# Patient Record
Sex: MALE | Race: BLACK OR AFRICAN AMERICAN | Employment: FULL TIME | URBAN - METROPOLITAN AREA
[De-identification: names, ages, dates, MRNs, and addresses within clinical notes are randomized per-mention and may not be internally consistent; named-entity substitution may affect disease eponyms.]

---

## 2017-03-02 ENCOUNTER — OFFICE VISIT (OUTPATIENT)
Dept: FAMILY MEDICINE CLINIC | Facility: CLINIC | Age: 22
End: 2017-03-02

## 2017-03-02 VITALS
RESPIRATION RATE: 16 BRPM | TEMPERATURE: 98 F | WEIGHT: 228 LBS | BODY MASS INDEX: 34 KG/M2 | OXYGEN SATURATION: 99 % | HEART RATE: 64 BPM | SYSTOLIC BLOOD PRESSURE: 110 MMHG | DIASTOLIC BLOOD PRESSURE: 68 MMHG

## 2017-03-02 DIAGNOSIS — J45.20 MILD INTERMITTENT ASTHMA IN ADULT WITHOUT COMPLICATION: Primary | ICD-10-CM

## 2017-03-02 PROCEDURE — 99214 OFFICE O/P EST MOD 30 MIN: CPT | Performed by: EMERGENCY MEDICINE

## 2017-03-02 RX ORDER — ALBUTEROL SULFATE 90 UG/1
2 AEROSOL, METERED RESPIRATORY (INHALATION) EVERY 4 HOURS PRN
Qty: 1 INHALER | Refills: 2 | Status: SHIPPED | OUTPATIENT
Start: 2017-03-02 | End: 2017-08-17

## 2017-03-02 NOTE — PROGRESS NOTES
Chief Complaint:   Patient presents with:  Asthma: Pt here for Asthma f/u and fmla paper work. HPI:   This is a 23year old male     Here for follow up asthma  Needs completion of FMLA forms for asthma. Missing work because of asthma.  Has to leave wor Weight as of this encounter: 228 lb. Vital signs reviewed. Appears stated age, well groomed.   GENERAL: well developed, well nourished, well hydrated, no distress  SKIN: good skin turgor, no obvious rashes  HEENT: atraumatic, normocephalic, ears, nose and

## 2017-03-02 NOTE — PATIENT INSTRUCTIONS
Thank you for choosing [facility]  To Do:  FOR JENNI CLAY  1. Use albuterol as a rescue and only as needed  2. Start Asmanex , use twice daily  3.  Follow up in 1 month for recheck            Understanding Asthma    Asthma causes swelling and n out)  5. Low energy or feeling tired  Over time, chronic mild inflammation can lead to permanent scarring of airways and loss of lung function. Moderate flare-ups  When sensitive airways are irritated by a trigger, the muscles around the airways tighten. [address]98. All rights reserved. This information is not intended as a substitute for professional medical care. Always follow your healthcare professional's instructions. Inhaler Use  The inhaler that you were prescribed contains a potent medicine.  It s prescribed that attaches to your inhaler. This increases the amount of medicine that goes to your lungs. It also improves how well each treatment works. Ask your doctor about this if you did not receive one.     Keep it clean  Remove the metal canister and

## 2017-03-13 ENCOUNTER — TELEPHONE (OUTPATIENT)
Dept: FAMILY MEDICINE CLINIC | Facility: CLINIC | Age: 22
End: 2017-03-13

## 2017-03-14 NOTE — TELEPHONE ENCOUNTER
Patient saw Dr José Logan on 3-2, at this time he gave the doctor CHARITO paperwork for completion, calling for status of this paperwork.   Call him at [phone] with status

## 2017-03-14 NOTE — TELEPHONE ENCOUNTER
FMLA form completed for patient. Copy of form sent to scanning. Original placed at  for patient. Called patient and spoke with him. Advised patient of this information. Patient states understanding.

## 2017-07-26 ENCOUNTER — OFFICE VISIT (OUTPATIENT)
Dept: FAMILY MEDICINE CLINIC | Facility: CLINIC | Age: 22
End: 2017-07-26

## 2017-07-26 VITALS
OXYGEN SATURATION: 98 % | DIASTOLIC BLOOD PRESSURE: 70 MMHG | RESPIRATION RATE: 18 BRPM | HEART RATE: 62 BPM | SYSTOLIC BLOOD PRESSURE: 110 MMHG | BODY MASS INDEX: 32 KG/M2 | WEIGHT: 220 LBS | TEMPERATURE: 99 F

## 2017-07-26 DIAGNOSIS — K52.9 GASTROENTERITIS: Primary | ICD-10-CM

## 2017-07-26 PROCEDURE — 99213 OFFICE O/P EST LOW 20 MIN: CPT | Performed by: FAMILY MEDICINE

## 2017-07-26 RX ORDER — ONDANSETRON 4 MG/1
4 TABLET, FILM COATED ORAL EVERY 8 HOURS PRN
Qty: 10 TABLET | Refills: 0 | Status: SHIPPED | OUTPATIENT
Start: 2017-07-26 | End: 2017-07-29

## 2017-07-26 NOTE — PATIENT INSTRUCTIONS
Self-Care for Vomiting and Diarrhea    Vomiting and diarrhea can make you miserable. Your stomach and bowels are reacting to an irritant. This might be food, medicine, or viral stomach flu.  Vomiting and diarrhea are two ways your body can remove the prob · Certain over-the-counter antihistamines can help control nausea. Other medicines can help soothe stomach upset. Ask your healthcare provider which medicines may help you.   When to call your healthcare provider  Call your healthcare provider if you have:

## 2017-07-27 NOTE — PROGRESS NOTES
Lani Linda is a 23year old male. Patient presents with:  Abdominal Pain: pt c\o of abd pain, vomitting, loose stools, x 2days       HPI:   Abdominal pain and vomiting  Pt developed abdominal 2 days ago. Last night started vomiting - NBNB.  Felt feveris Sitting, Cuff Size: adult)   Pulse 62   Temp 98.7 °F (37.1 °C) (Oral)   Resp 18   Wt 220 lb   SpO2 98%   BMI 32.49 kg/m²  Body mass index is 32.49 kg/m².       GENERAL: well developed, well nourished,in no apparent distress  SKIN: no rashes,no suspicious le

## 2017-07-28 ENCOUNTER — TELEPHONE (OUTPATIENT)
Dept: FAMILY MEDICINE CLINIC | Facility: CLINIC | Age: 22
End: 2017-07-28

## 2017-07-28 NOTE — TELEPHONE ENCOUNTER
Per Dr Isa Cleary, ok to write note but if pt is not better over the weekend, he needs to go to ER. No work extension after this without being evaluated. Pt agrees.

## 2017-07-28 NOTE — TELEPHONE ENCOUNTER
pt still feeling ill, weak and some diarrhea/vomiting this morning, works around food, wants note revised to indicate return to work on Monday, please send thru mychart and print copy as pt unsure if he can get into [employer], call when complete

## 2017-08-17 ENCOUNTER — OFFICE VISIT (OUTPATIENT)
Dept: FAMILY MEDICINE CLINIC | Facility: CLINIC | Age: 22
End: 2017-08-17

## 2017-08-17 VITALS
RESPIRATION RATE: 16 BRPM | TEMPERATURE: 99 F | OXYGEN SATURATION: 98 % | BODY MASS INDEX: 33.03 KG/M2 | HEIGHT: 69 IN | DIASTOLIC BLOOD PRESSURE: 74 MMHG | WEIGHT: 223 LBS | HEART RATE: 68 BPM | SYSTOLIC BLOOD PRESSURE: 110 MMHG

## 2017-08-17 DIAGNOSIS — J45.30 MILD PERSISTENT ASTHMA WITHOUT COMPLICATION: Primary | ICD-10-CM

## 2017-08-17 PROCEDURE — 99214 OFFICE O/P EST MOD 30 MIN: CPT | Performed by: FAMILY MEDICINE

## 2017-08-17 RX ORDER — ALBUTEROL SULFATE 90 UG/1
2 AEROSOL, METERED RESPIRATORY (INHALATION) EVERY 4 HOURS PRN
Qty: 1 INHALER | Refills: 3 | Status: SHIPPED | OUTPATIENT
Start: 2017-08-17 | End: 2018-09-19

## 2017-08-17 NOTE — PATIENT INSTRUCTIONS
· Use Asmanex, every day they go to work. Use it once in the morning and once at night on the days that you work.   Remember to rinse her mouth out with water or brush your teeth after use to avoid infection on the tongue or throat  · Albuterol as needed

## 2017-08-17 NOTE — PROGRESS NOTES
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  As 68   Temp 98.5 °F (36.9 °C) (Oral)   Resp 16   Ht 69\"   Wt 223 lb   SpO2 98%   BMI 32.93 kg/m²  Estimated body mass index is 32.93 kg/m² as calculated from the following:    Height as of this encounter: 69\". Weight as of this encounter: 223 lb.    Fabiana Education: Patient/Caregiver Education: There are no barriers to learning. Medical education done. Outcome: Patient verbalizes understanding. Patient is notified to call with any questions, complications, allergies, or worsening or changing symptoms.   Pa

## 2017-09-20 ENCOUNTER — TELEPHONE (OUTPATIENT)
Dept: FAMILY MEDICINE CLINIC | Facility: CLINIC | Age: 22
End: 2017-09-20

## 2017-09-20 NOTE — TELEPHONE ENCOUNTER
LOV 8/17/17 notes  ASSESSMENT AND PLAN:   1. Mild persistent asthma without complication  - use inhaled steroid every day he is scheduled to work  - mometaosne 1 puff bid (days at work).  Instructed to rinse mouth out with water after use  - albuterol prn

## 2017-09-20 NOTE — TELEPHONE ENCOUNTER
Patient came into the office today with paperwork for FMLA. Stated Dr. Sari West told the patient to come in anytime with the paperwork and drop it off so she can fill it out. Putting the paperwork in Dr. Sari West folder to have tilled out and completed.  Please c

## 2017-09-22 ENCOUNTER — MED REC SCAN ONLY (OUTPATIENT)
Dept: FAMILY MEDICINE CLINIC | Facility: CLINIC | Age: 22
End: 2017-09-22

## 2017-09-22 NOTE — TELEPHONE ENCOUNTER
FMLA completed. Signed by Dr. Zaid Virk. I cannot fax forms to patient as there are sections of the FMLA for him to complete and sign and we don't have an authorization on file to fax. I called him and advised of above.  He will come in and  paperwor

## 2017-10-10 ENCOUNTER — TELEPHONE (OUTPATIENT)
Dept: FAMILY MEDICINE CLINIC | Facility: CLINIC | Age: 22
End: 2017-10-10

## 2017-10-10 NOTE — TELEPHONE ENCOUNTER
Pt states the FMLA forms that were just completed, states he can only leave 2 hrs early during asthma flare-ups. He states his flare-ups are unpredictable & may flare up mid day.   He is requesting the FMLA be updated to somehow reflect that he may need to

## 2017-10-10 NOTE — TELEPHONE ENCOUNTER
Pt notified of below orders. Pt states he has been using both Inhalers as ordered. FMLA updated & placed at  for pt to . All questions answered, pt expresses understanding.

## 2017-10-10 NOTE — TELEPHONE ENCOUNTER
Recommendations were based on what patient explained during appt. Patient may need to leave work early - but should first start by taking inhaler while at work to see if symptoms improve - if they don't then it is reasonable for him to leave work early.

## 2017-10-10 NOTE — TELEPHONE ENCOUNTER
Patient had FMLA paperwork done for work & now work is giving him a hard time about the paperwork that was completes. He is wanting to know if he can talk to a nurse about the paperwork and possibly drop off new paperwork to have filled out.

## 2017-10-17 ENCOUNTER — TELEPHONE (OUTPATIENT)
Dept: FAMILY MEDICINE CLINIC | Facility: CLINIC | Age: 22
End: 2017-10-17

## 2017-10-17 NOTE — TELEPHONE ENCOUNTER
Patient dropped off FMLA to be filled states he will  when ready, please call patient.  Copy made and put in fax room, other copy given to Triage

## 2017-10-19 NOTE — TELEPHONE ENCOUNTER
Please see telephone encounter dated 10/10/17. FMLA forms were already updated and signed by Dr. Deepak Cottrell to reflect the changes he requested. They were placed up front for him to .      I called patient and asked if he'd picked up corrected FMLA forms

## 2018-09-19 ENCOUNTER — TELEPHONE (OUTPATIENT)
Dept: FAMILY MEDICINE CLINIC | Facility: CLINIC | Age: 23
End: 2018-09-19

## 2018-09-19 ENCOUNTER — OFFICE VISIT (OUTPATIENT)
Dept: FAMILY MEDICINE CLINIC | Facility: CLINIC | Age: 23
End: 2018-09-19
Payer: COMMERCIAL

## 2018-09-19 VITALS
HEIGHT: 69 IN | DIASTOLIC BLOOD PRESSURE: 80 MMHG | SYSTOLIC BLOOD PRESSURE: 120 MMHG | TEMPERATURE: 99 F | RESPIRATION RATE: 16 BRPM | OXYGEN SATURATION: 98 % | HEART RATE: 67 BPM | BODY MASS INDEX: 34.21 KG/M2 | WEIGHT: 231 LBS

## 2018-09-19 DIAGNOSIS — Z13.0 SCREENING FOR ENDOCRINE, NUTRITIONAL, METABOLIC AND IMMUNITY DISORDER: ICD-10-CM

## 2018-09-19 DIAGNOSIS — G47.9 SLEEP DISORDER: ICD-10-CM

## 2018-09-19 DIAGNOSIS — Z13.228 SCREENING FOR ENDOCRINE, NUTRITIONAL, METABOLIC AND IMMUNITY DISORDER: ICD-10-CM

## 2018-09-19 DIAGNOSIS — Z13.29 SCREENING FOR ENDOCRINE, NUTRITIONAL, METABOLIC AND IMMUNITY DISORDER: ICD-10-CM

## 2018-09-19 DIAGNOSIS — J45.30 MILD PERSISTENT ASTHMA WITHOUT COMPLICATION: Primary | ICD-10-CM

## 2018-09-19 DIAGNOSIS — J35.1 ENLARGED TONSILS: ICD-10-CM

## 2018-09-19 DIAGNOSIS — Z13.21 SCREENING FOR ENDOCRINE, NUTRITIONAL, METABOLIC AND IMMUNITY DISORDER: ICD-10-CM

## 2018-09-19 PROCEDURE — 99213 OFFICE O/P EST LOW 20 MIN: CPT | Performed by: FAMILY MEDICINE

## 2018-09-19 RX ORDER — ALBUTEROL SULFATE 90 UG/1
2 AEROSOL, METERED RESPIRATORY (INHALATION) EVERY 4 HOURS PRN
Qty: 1 INHALER | Refills: 3 | Status: SHIPPED | OUTPATIENT
Start: 2018-09-19

## 2018-09-19 NOTE — TELEPHONE ENCOUNTER
Called pt to give him information to schedule sleep study. No answer. Left message for pt to call the office back. PSR: Please give pt information below when he calls back.  Thank you     To Schedule an appointment please call  [phone]  [facility]

## 2018-09-19 NOTE — PROGRESS NOTES
[facility] Family Medicine Office Note  Chief Complaint:   Patient presents with:  Asthma: f/u  Sleep Problem: sleeps 3 hours every night      HPI:   This is a 20year old male coming in for  HPI  Asthma   Here for follow up   Condition unchanged Musculoskeletal: Negative for arthralgias and myalgias. Skin: Negative for pallor and rash. Neurological: Negative for dizziness and headaches.         EXAM:   /80   Pulse 67   Temp 98.8 °F (37.1 °C) (Oral)   Resp 16   Ht 69\"   Wt 231 lb   SpO2 Prescriptions     Signed Prescriptions Disp Refills   • Albuterol Sulfate HFA (PROAIR HFA) 108 (90 Base) MCG/ACT Inhalation Aero Soln 1 Inhaler 3     Sig: Inhale 2 puffs into the lungs every 4 (four) hours as needed for Wheezing or Shortness of Breath.    •

## 2018-09-19 NOTE — TELEPHONE ENCOUNTER
Pt pharmacy called stating that the Mometasone Furoate [facility] HFA) 100 MCG/ACT Inhalation Aerosol   Is not being coved by Pt insurance.  If we could please send them a prescription change

## 2018-09-19 NOTE — TELEPHONE ENCOUNTER
Called and left voice message informed patient FMLA paperwork are missing pages. Informed him he can drop them off at the walk in clinic incase we are close or he can stop by during work hours.

## 2018-09-19 NOTE — TELEPHONE ENCOUNTER
Asmanex is not covered for patient. Pharmacy is requesting an alternative medication. Please advise. Thank you!

## 2018-09-26 PROBLEM — G47.09 OTHER INSOMNIA: Status: ACTIVE | Noted: 2018-09-26

## 2018-09-26 PROBLEM — J45.909: Status: ACTIVE | Noted: 2018-09-26

## 2018-09-26 PROBLEM — J45.909 COLD-INDUCED ASTHMA WITHOUT COMPLICATION: Status: ACTIVE | Noted: 2018-09-26

## 2018-10-02 NOTE — TELEPHONE ENCOUNTER
Patient has yet to return phone call regarding [place] paper work being incomplete. Patient is now establish care with a different provider, papers have been dismissed.

## 2021-04-09 DIAGNOSIS — Z23 NEED FOR VACCINATION: ICD-10-CM

## 2024-09-20 ENCOUNTER — OFFICE VISIT (OUTPATIENT)
Dept: OCCUPATIONAL MEDICINE | Age: 29
End: 2024-09-20
Attending: FAMILY MEDICINE

## (undated) NOTE — MR AVS SNAPSHOT
[address]  [address]  [place] [id number] [phone]               Thank you for choosing us for your health care visit with Lan Saab MD.  We are glad to serve you and happy to provide you with this summary of The air you inhale contains oxygen. When oxygen reaches the air sacs, it passes into the blood vessels surrounding the sacs. Your blood then delivers oxygen to all of your cells. Carbon dioxide is removed from the body in a similar way, as you exhale.   Whe Call 911, or have someone call for you, if you have any of these symptoms and they are not relieved right away by taking your quick-relief medication as prescribed:  · Severe difficulty breathing  · Being too short of breath to talk or walk  · Lips or fing your lungs are full of air, drawing the medicine deep into your lungs. 12. Hold your breath for 10 seconds, or as long as you can comfortably hold your breath. Then breathe out slowly.   13. If you have been advised to take 2 puffs, wait 5 minutes, then re worse. Go to the emergency room or urgent care right away. An asthma attack is easiest to treat in the early stages before it becomes severe.   When to seek medical advice  Get prompt medical attention if any of the following occur:  · Increased wheezing or view more details from this visit by going to [email or web address]. If you've recently had a stay at the Hospital you can access your discharge instructions in [facility] by going to Visits < Admission Summaries.  If you've been to the Emergency Depar

## (undated) NOTE — Clinical Note
ASTHMA ACTION PLAN for Inna Raymond     : 3/2/1995     Date: 3/2/2017  Provider:  Aldo Diego MD  Phone for doctor or clinic: [facility], [place] [address] [address]  [facility] [address] [phone]    ACT Score: 16 the phone and mailed copy to patient or submitted via [address]      Signatures:  Provider  Maura Singh MD   Patient Caretaker

## (undated) NOTE — LETTER
10/19/18        [place] [id number]      Dear Miguel Arzola,    [facility] records indicate that you have outstanding lab work and or testing that was ordered for you and has not yet been completed:        CBC W Differential W Plateleana

## (undated) NOTE — LETTER
Date: 7/26/2017    Patient Name: Garfield Schuster          To Whom it may concern: The above patient was seen at the [facility] for treatment of a medical condition.     This patient should be excused from attending work/school from 7/26/17 th